# Patient Record
Sex: FEMALE | Race: WHITE | Employment: STUDENT | URBAN - METROPOLITAN AREA
[De-identification: names, ages, dates, MRNs, and addresses within clinical notes are randomized per-mention and may not be internally consistent; named-entity substitution may affect disease eponyms.]

---

## 2017-04-05 ENCOUNTER — OFFICE VISIT (OUTPATIENT)
Dept: URGENT CARE | Facility: CLINIC | Age: 22
End: 2017-04-05
Payer: COMMERCIAL

## 2017-04-05 VITALS
BODY MASS INDEX: 27.35 KG/M2 | SYSTOLIC BLOOD PRESSURE: 120 MMHG | TEMPERATURE: 99.2 F | WEIGHT: 164.13 LBS | HEART RATE: 85 BPM | DIASTOLIC BLOOD PRESSURE: 62 MMHG | HEIGHT: 65 IN | OXYGEN SATURATION: 96 %

## 2017-04-05 DIAGNOSIS — J01.40 ACUTE PANSINUSITIS, RECURRENCE NOT SPECIFIED: ICD-10-CM

## 2017-04-05 DIAGNOSIS — H65.01 RIGHT ACUTE SEROUS OTITIS MEDIA, RECURRENCE NOT SPECIFIED: ICD-10-CM

## 2017-04-05 PROCEDURE — 99214 OFFICE O/P EST MOD 30 MIN: CPT | Performed by: PHYSICIAN ASSISTANT

## 2017-04-05 RX ORDER — AMOXICILLIN AND CLAVULANATE POTASSIUM 875; 125 MG/1; MG/1
1 TABLET, FILM COATED ORAL 2 TIMES DAILY
Qty: 20 TAB | Refills: 0 | Status: SHIPPED | OUTPATIENT
Start: 2017-04-05 | End: 2017-04-15

## 2017-04-05 ASSESSMENT — ENCOUNTER SYMPTOMS
FOCAL WEAKNESS: 0
SHORTNESS OF BREATH: 0
NECK PAIN: 0
SENSORY CHANGE: 0
DIARRHEA: 0
SWOLLEN GLANDS: 0
SPUTUM PRODUCTION: 0
WHEEZING: 0
SORE THROAT: 0
COUGH: 1
MYALGIAS: 0
SINUS PRESSURE: 1
DIZZINESS: 0
VOMITING: 0
HOARSE VOICE: 0
FEVER: 0
NAUSEA: 0
HEADACHES: 1
TINGLING: 0
ABDOMINAL PAIN: 0
PALPITATIONS: 0
CHILLS: 1

## 2017-04-05 ASSESSMENT — PAIN SCALES - GENERAL: PAINLEVEL: 5=MODERATE PAIN

## 2017-04-05 NOTE — MR AVS SNAPSHOT
"        Conchita Car   2017 3:45 PM   Office Visit   MRN: [id number]    Department:  [facility]   Dept Phone:  [phone]    Description:  Female : 1995   Provider:  Rosa Maria Anderson PA-C           Reason for Visit     Sinusitis Bilateral ear pain, pressure, runny nose, congestion, eye pressure, fatigue.       Allergies as of 2017     No Known Allergies      You were diagnosed with     Acute pansinusitis, recurrence not specified   [[id number]]       Right acute serous otitis media, recurrence not specified   [[id number]]         Vital Signs     Blood Pressure Pulse Temperature Height Weight Body Mass Index    120/62 mmHg 85 37.3 °C (99.2 °F) 1.64 m (5' 4.57\") 74.447 kg (164 lb 2 oz) 27.68 kg/m2    Oxygen Saturation                   96%           Basic Information     Date Of Birth Sex Race Ethnicity Preferred Language    1995 Female White Unknown English      Problem List              ICD-10-CM Priority Class Noted - Resolved    Neck strain S16.1XXA   2012 - Present      Health Maintenance        Date Due Completion Dates    IMM HEP B VACCINE (1 of 3 - Primary Series) 1994 ---    IMM HEP A VACCINE (1 of 2 - Standard Series) 1995 ---    IMM HPV VACCINE (1 of 3 - Female 3 Dose Series) 2005 ---    IMM VARICELLA (CHICKENPOX) VACCINE (1 of 2 - 2 Dose Adolescent Series) 2007 ---    IMM DTaP/Tdap/Td Vaccine (1 - Tdap) 2013 ---    PAP SMEAR 2015 ---            Current Immunizations     No immunizations on file.      Below and/or attached are the medications your provider expects you to take. Review all of your home medications and newly ordered medications with your provider and/or pharmacist. Follow medication instructions as directed by your provider and/or pharmacist. Please keep your medication list with you and share with your provider. Update the information when medications are discontinued, doses are changed, or new medications (including " over-the-counter products) are added; and carry medication information at all times in the event of emergency situations     Allergies:  No Known Allergies          Medications  Valid as of: April 05, 2017 -  4:11 PM    Generic Name Brand Name Tablet Size Instructions for use    Amoxicillin-Pot Clavulanate (Tab) AUGMENTIN 875-125 MG Take 1 Tab by mouth 2 times a day for 10 days.        DM-Phenylephrine-Acetaminophen   Take  by mouth.        Naproxen (Tab) NAPROSYN 500 MG Take 1 Tab by mouth 2 times a day, with meals. Prn pain        .                 Medicines prescribed today were sent to:     [facility] [address] [place] [address], [place] - [address] [facility] AT [facility]    [address] [facility] [place] [address]    Phone: [phone] Fax: [phone]    Open 24 Hours?: No      Medication refill instructions:       If your prescription bottle indicates you have medication refills left, it is not necessary to call your provider’s office. Please contact your pharmacy and they will refill your medication.    If your prescription bottle indicates you do not have any refills left, you may request refills at any time through one of the following ways: The online Smartaxi system (except Urgent Care), by calling your provider’s office, or by asking your pharmacy to contact your provider’s office with a refill request. Medication refills are processed only during regular business hours and may not be available until the next business day. Your provider may request additional information or to have a follow-up visit with you prior to refilling your medication.   *Please Note: Medication refills are assigned a new Rx number when refilled electronically. Your pharmacy may indicate that no refills were authorized even though a new prescription for the same medication is available at the pharmacy. Please request the medicine by name with the pharmacy before contacting your provider for a refill.            TutorGroup Access Code: [email or web address]  Expires: 5/5/2017  4:11 PM    Your email address is not on file at [facility].  Email Addresses are required for you to sign up for [employer]utor[employer], please contact [phone] to verify your personal information and to provide your email address prior to attempting to register for TutorGroup.    Conchita Car  [facility] [place] [address]    TutorGroup  A secure, online tool to manage your health information     [facility]’s TutorGroup® is a secure, online tool that connects you to your personalized health information from the privacy of your home -- day or night - making it very easy for you to manage your healthcare. Once the activation process is completed, you can even access your medical information using the TutorGroup candice, which is available for free in the Apple Candice store or Google Play store.     To learn more about TutorGroup, visit [email or web address]    There are two levels of access available (as shown below):   My Chart Features  [facility] Primary Care Doctor [facility]  Specialists [facility]  Urgent  Care Non-[facility] Primary Care Doctor   Email your healthcare team securely and privately 24/7 X X X    Manage appointments: schedule your next appointment; view details of past/upcoming appointments X      Request prescription refills. X      View recent personal medical records, including lab and immunizations X X X X   View health record, including health history, allergies, medications X X X X   Read reports about your outpatient visits, procedures, consult and ER notes X X X X   See your discharge summary, which is a recap of your hospital and/or ER visit that includes your diagnosis, lab results, and care plan X X  X     How to register for TutorGroup:  Once your e-mail address has been verified, follow the following steps to sign up for [employer].     1. Go to  [email or web address]  2. Click on the Sign Up Now box, which takes you to the New Member Sign Up page.  You will need to provide the following information:  a. Enter your farmaciamarket Access Code exactly as it appears at the top of this page. (You will not need to use this code after you’ve completed the sign-up process. If you do not sign up before the expiration date, you must request a new code.)   b. Enter your date of birth.   c. Enter your home email address.   d. Click Submit, and follow the next screen’s instructions.  3. Create a StellaServicet ID. This will be your farmaciamarket login ID and cannot be changed, so think of one that is secure and easy to remember.  4. Create a farmaciamarket password. You can change your password at any time.  5. Enter your Password Reset Question and Answer. This can be used at a later time if you forget your password.   6. Enter your e-mail address. This allows you to receive e-mail notifications when new information is available in farmaciamarket.  7. Click Sign Up. You can now view your health information.    For assistance activating your farmaciamarket account, call [phone]

## 2018-12-21 ENCOUNTER — OFFICE VISIT (OUTPATIENT)
Dept: URGENT CARE | Facility: CLINIC | Age: 23
End: 2018-12-21
Payer: COMMERCIAL

## 2018-12-21 VITALS
HEIGHT: 65 IN | TEMPERATURE: 97.4 F | DIASTOLIC BLOOD PRESSURE: 72 MMHG | OXYGEN SATURATION: 97 % | SYSTOLIC BLOOD PRESSURE: 120 MMHG | HEART RATE: 66 BPM | BODY MASS INDEX: 27.32 KG/M2 | WEIGHT: 164 LBS

## 2018-12-21 DIAGNOSIS — J35.8 TONSILLAR EXUDATE: ICD-10-CM

## 2018-12-21 DIAGNOSIS — Z20.818 EXPOSURE TO STREP THROAT: ICD-10-CM

## 2018-12-21 LAB
INT CON NEG: NORMAL
INT CON POS: NORMAL
S PYO AG THROAT QL: NORMAL

## 2018-12-21 PROCEDURE — 87880 STREP A ASSAY W/OPTIC: CPT | Performed by: NURSE PRACTITIONER

## 2018-12-21 PROCEDURE — 99213 OFFICE O/P EST LOW 20 MIN: CPT | Mod: 25 | Performed by: NURSE PRACTITIONER

## 2018-12-21 ASSESSMENT — ENCOUNTER SYMPTOMS
MYALGIAS: 0
VOMITING: 0
SWOLLEN GLANDS: 0
NAUSEA: 0
DIZZINESS: 0
COUGH: 0
EYE PAIN: 0
CHILLS: 0
SORE THROAT: 1
TROUBLE SWALLOWING: 0
FEVER: 0
SHORTNESS OF BREATH: 0
HOARSE VOICE: 0

## 2018-12-21 NOTE — PROGRESS NOTES
Subjective:   Conchita Car is a 23 y.o. female who presents for Pharyngitis (exposure to strep, white spot on throat x1 day )        Pharyngitis    This is a new problem. The current episode started yesterday. The problem has been unchanged. The pain is worse on the left side. There has been no fever. The pain is at a severity of 5/10. The pain is moderate. Pertinent negatives include no congestion, coughing, ear discharge, ear pain, hoarse voice, plugged ear sensation, shortness of breath, swollen glands, trouble swallowing or vomiting. Associated symptoms comments: White spot on left tonsil  . She has had no exposure to strep. She has tried acetaminophen for the symptoms. The treatment provided no relief.     Review of Systems   Constitutional: Negative for chills and fever.   HENT: Positive for sore throat. Negative for congestion, ear discharge, ear pain, hoarse voice and trouble swallowing.    Eyes: Negative for pain.   Respiratory: Negative for cough and shortness of breath.    Cardiovascular: Negative for chest pain.   Gastrointestinal: Negative for nausea and vomiting.   Genitourinary: Negative for hematuria.   Musculoskeletal: Negative for myalgias.   Skin: Negative for rash.   Neurological: Negative for dizziness.     No Known Allergies   Objective:   There were no vitals taken for this visit.  Physical Exam   Constitutional: She is oriented to person, place, and time. She appears well-developed and well-nourished. No distress.   HENT:   Head: Normocephalic and atraumatic.   Right Ear: Tympanic membrane normal.   Left Ear: Tympanic membrane normal.   Nose: Nose normal. Right sinus exhibits no maxillary sinus tenderness and no frontal sinus tenderness. Left sinus exhibits no maxillary sinus tenderness and no frontal sinus tenderness.   Mouth/Throat: Uvula is midline, oropharynx is clear and moist and mucous membranes are normal. No trismus in the jaw. No uvula swelling. No posterior  oropharyngeal edema, posterior oropharyngeal erythema or tonsillar abscesses. Tonsillar exudate (left tonsil ).   Eyes: Pupils are equal, round, and reactive to light. Conjunctivae and EOM are normal. Right eye exhibits no discharge. Left eye exhibits no discharge.   Cardiovascular: Normal rate and regular rhythm.    No murmur heard.  Pulmonary/Chest: Effort normal and breath sounds normal. No respiratory distress.   Abdominal: Soft. She exhibits no distension. There is no tenderness.   Lymphadenopathy:     She has no cervical adenopathy.   Neurological: She is alert and oriented to person, place, and time. She has normal reflexes. No sensory deficit.   Skin: Skin is warm, dry and intact.   Psychiatric: She has a normal mood and affect.         Assessment/Plan:     1. Exposure to strep throat  POCT Rapid Strep A   2. Tonsillar exudate  POCT Rapid Strep A     Strep negative    Uvula midline, no trismus, no cellulitis, or peritonsillar abscess. Diagnosis consisted with viral. Encouraged warm gargles, viral etiology.Advised to continue supportive care with Tylenol and/or ibuprofen for fevers and discomfort. Increased fluids and electrolytes.  Patient given precautionary s/sx that mandate immediate follow up and evaluation in the ED. Advised of risks of not doing so.    DDX, Supportive care, and indications for immediate follow-up discussed with patient.    Instructed to return to clinic or nearest emergency department if we are not available for any change in condition, further concerns, or worsening of symptoms.    The patient demonstrated a good understanding and agreed with the treatment plan.

## 2021-04-20 ENCOUNTER — IMMUNIZATION (OUTPATIENT)
Dept: FAMILY PLANNING/WOMEN'S HEALTH CLINIC | Facility: IMMUNIZATION CENTER | Age: 26
End: 2021-04-20
Payer: COMMERCIAL

## 2021-04-20 DIAGNOSIS — Z23 ENCOUNTER FOR VACCINATION: Primary | ICD-10-CM

## 2021-04-20 PROCEDURE — 0001A PFIZER SARS-COV-2 VACCINE: CPT

## 2021-04-20 PROCEDURE — 91300 PFIZER SARS-COV-2 VACCINE: CPT

## 2021-05-14 ENCOUNTER — IMMUNIZATION (OUTPATIENT)
Dept: FAMILY PLANNING/WOMEN'S HEALTH CLINIC | Facility: IMMUNIZATION CENTER | Age: 26
End: 2021-05-14
Payer: COMMERCIAL

## 2021-05-14 DIAGNOSIS — Z23 ENCOUNTER FOR VACCINATION: Primary | ICD-10-CM

## 2021-05-14 PROCEDURE — 0002A PFIZER SARS-COV-2 VACCINE: CPT

## 2021-05-14 PROCEDURE — 91300 PFIZER SARS-COV-2 VACCINE: CPT

## 2024-09-17 ENCOUNTER — APPOINTMENT (OUTPATIENT)
Dept: RADIOLOGY | Facility: IMAGING CENTER | Age: 29
End: 2024-09-17
Payer: COMMERCIAL

## 2024-09-17 ENCOUNTER — OFFICE VISIT (OUTPATIENT)
Dept: URGENT CARE | Facility: CLINIC | Age: 29
End: 2024-09-17
Payer: COMMERCIAL

## 2024-09-17 VITALS
HEIGHT: 65 IN | DIASTOLIC BLOOD PRESSURE: 88 MMHG | OXYGEN SATURATION: 96 % | BODY MASS INDEX: 29.32 KG/M2 | HEART RATE: 75 BPM | WEIGHT: 176 LBS | SYSTOLIC BLOOD PRESSURE: 128 MMHG | RESPIRATION RATE: 20 BRPM | TEMPERATURE: 98.5 F

## 2024-09-17 DIAGNOSIS — M54.42 ACUTE BILATERAL LOW BACK PAIN WITH LEFT-SIDED SCIATICA: ICD-10-CM

## 2024-09-17 LAB
APPEARANCE UR: CLEAR
BILIRUB UR STRIP-MCNC: NEGATIVE MG/DL
COLOR UR AUTO: YELLOW
GLUCOSE UR STRIP.AUTO-MCNC: NEGATIVE MG/DL
KETONES UR STRIP.AUTO-MCNC: NEGATIVE MG/DL
LEUKOCYTE ESTERASE UR QL STRIP.AUTO: NORMAL
NITRITE UR QL STRIP.AUTO: NEGATIVE
PH UR STRIP.AUTO: 5.5 [PH] (ref 5–8)
POCT INT CON NEG: NEGATIVE
POCT INT CON POS: POSITIVE
POCT URINE PREGNANCY TEST: NEGATIVE
PROT UR QL STRIP: NEGATIVE MG/DL
RBC UR QL AUTO: NEGATIVE
SP GR UR STRIP.AUTO: 1.03
UROBILINOGEN UR STRIP-MCNC: NORMAL MG/DL

## 2024-09-17 PROCEDURE — 99203 OFFICE O/P NEW LOW 30 MIN: CPT

## 2024-09-17 PROCEDURE — 3079F DIAST BP 80-89 MM HG: CPT

## 2024-09-17 PROCEDURE — 81025 URINE PREGNANCY TEST: CPT

## 2024-09-17 PROCEDURE — 72100 X-RAY EXAM L-S SPINE 2/3 VWS: CPT | Mod: TC,FY | Performed by: RADIOLOGY

## 2024-09-17 PROCEDURE — 3074F SYST BP LT 130 MM HG: CPT

## 2024-09-17 PROCEDURE — 81002 URINALYSIS NONAUTO W/O SCOPE: CPT

## 2024-09-17 RX ORDER — KETOROLAC TROMETHAMINE 15 MG/ML
15 INJECTION, SOLUTION INTRAMUSCULAR; INTRAVENOUS ONCE
Status: COMPLETED | OUTPATIENT
Start: 2024-09-17 | End: 2024-09-17

## 2024-09-17 RX ORDER — LIDOCAINE 4 G/G
PATCH TOPICAL
Qty: 15 PATCH | Refills: 0 | Status: SHIPPED | OUTPATIENT
Start: 2024-09-17

## 2024-09-17 RX ORDER — METHOCARBAMOL 500 MG/1
TABLET, FILM COATED ORAL
Qty: 30 TABLET | Refills: 0 | Status: SHIPPED | OUTPATIENT
Start: 2024-09-17

## 2024-09-17 RX ADMIN — KETOROLAC TROMETHAMINE 15 MG: 15 INJECTION, SOLUTION INTRAMUSCULAR; INTRAVENOUS at 11:25

## 2024-09-17 ASSESSMENT — ENCOUNTER SYMPTOMS
DIARRHEA: 0
TINGLING: 1
FEVER: 0
SORE THROAT: 0
FLANK PAIN: 0
MYALGIAS: 0
VOMITING: 0
ABDOMINAL PAIN: 0
PERIANAL NUMBNESS: 0
NAUSEA: 0
BOWEL INCONTINENCE: 0
NUMBNESS: 1
BACK PAIN: 1
HEADACHES: 0
SHORTNESS OF BREATH: 0
PARESIS: 0
CHILLS: 0
COUGH: 0
PARESTHESIAS: 0

## 2024-09-17 NOTE — PROGRESS NOTES
"Subjective:   Conchita Car is a 29 y.o. female who presents for Back Pain (1 day)      Back Pain  This is a new problem. The current episode started in the past 7 days. The problem has been rapidly worsening (Rapidly worsened after bending over to  item yesterday) since onset. The pain is present in the lumbar spine. The pain radiates to the left thigh. The pain is moderate. The symptoms are aggravated by bending. Associated symptoms include numbness (Radiating down left lower extremity) and tingling (Left lower extremity). Pertinent negatives include no abdominal pain, bladder incontinence, bowel incontinence, chest pain, dysuria, fever, headaches, paresis, paresthesias, pelvic pain or perianal numbness. She has tried NSAIDs for the symptoms. The treatment provided mild relief.       Review of Systems   Constitutional:  Negative for chills, fever and malaise/fatigue.   HENT:  Negative for congestion, ear pain, hearing loss and sore throat.    Respiratory:  Negative for cough and shortness of breath.    Cardiovascular:  Negative for chest pain.   Gastrointestinal:  Negative for abdominal pain, bowel incontinence, diarrhea, nausea and vomiting.   Genitourinary:  Negative for bladder incontinence, dysuria, flank pain, frequency, hematuria, pelvic pain and urgency.   Musculoskeletal:  Positive for back pain. Negative for myalgias.   Skin:  Negative for rash.   Neurological:  Positive for tingling (Left lower extremity) and numbness (Radiating down left lower extremity). Negative for headaches and paresthesias.       Medications, Allergies, and current problem list reviewed today in Epic.     Objective:     /88   Pulse 75   Temp 36.9 °C (98.5 °F) (Temporal)   Resp 20   Ht 1.651 m (5' 5\")   Wt 79.8 kg (176 lb)   SpO2 96%     Physical Exam  Vitals and nursing note reviewed.   Constitutional:       General: She is not in acute distress.     Appearance: Normal appearance. She is not " ill-appearing.   HENT:      Head: Normocephalic and atraumatic.      Right Ear: Tympanic membrane normal.      Left Ear: Tympanic membrane normal.      Nose: Nose normal.      Mouth/Throat:      Mouth: Mucous membranes are moist.   Eyes:      Conjunctiva/sclera: Conjunctivae normal.   Cardiovascular:      Rate and Rhythm: Normal rate.      Heart sounds: Normal heart sounds.   Pulmonary:      Effort: Pulmonary effort is normal.   Abdominal:      General: Abdomen is flat.      Palpations: Abdomen is soft.   Musculoskeletal:      Cervical back: Normal range of motion.      Lumbar back: Spasms and tenderness present. No swelling, edema, deformity, signs of trauma or bony tenderness. Decreased range of motion.   Skin:     General: Skin is warm and dry.      Capillary Refill: Capillary refill takes less than 2 seconds.   Neurological:      Mental Status: She is alert and oriented to person, place, and time.   Psychiatric:         Mood and Affect: Mood normal.         Behavior: Behavior normal.       Results for orders placed or performed in visit on 09/17/24   POCT Urinalysis   Result Value Ref Range    POC Color YELLOW Negative    POC Appearance CLEAR Negative    POC Glucose NEGATIVE Negative mg/dL    POC Bilirubin NEGATIVE Negative mg/dL    POC Ketones NEGATIVE Negative mg/dL    POC Specific Gravity 1.030 <1.005 - >1.030    POC Blood NEGATIVE Negative    POC Urine PH 5.5 5.0 - 8.0    POC Protein NEGATIVE Negative mg/dL    POC Urobiligen 0.2 E. U. /DL Negative (0.2) mg/dL    POC Nitrites NEGATIVE Negative    POC Leukocyte Esterase SMALL Negative   POCT Pregnancy   Result Value Ref Range    POC Urine Pregnancy Test Negative     Internal Control Positive Positive     Internal Control Negative Negative        RADIOLOGY RESULTS   DX-LUMBAR SPINE-2 OR 3 VIEWS    Result Date: 9/17/2024 9/17/2024 11:23 AM HISTORY/REASON FOR EXAM:  Atraumatic Pain. Low back pain for 4 days TECHNIQUE/ EXAM DESCRIPTION AND NUMBER OF VIEWS:  3  views of the lumbar spine. COMPARISON: MRI lumbar spine 9/2/2005 FINDINGS: Alignment in the lumbar spine shows very slight curvature convex right with a Zaragoza angle 5 degrees. There are no vertebral anomalies. The bony trabecular pattern is normal.  The lumbar vertebral bodies have a normal height. The disc spaces are well maintained and symmetrical.  There is no evidence of spondylolisthesis or osseous lesion.  The posterior elements appear grossly normal on the limited series.     1.  Slight lumbar scoliosis convex right. 2.  Otherwise, unremarkable lumbar spine plain films.           Assessment/Plan:       1. Acute bilateral low back pain with left-sided sciatica  ketorolac (Toradol) 15 MG/ML injection 15 mg    DX-LUMBAR SPINE-2 OR 3 VIEWS    POCT Urinalysis    POCT Pregnancy    methocarbamol (ROBAXIN) 500 MG Tab    lidocaine (ASPERFLEX) 4 % Patch        After assessment it does appear that patient has acute low back pain with left-sided sciatica.  UA in office did show small amount of leukocytes but patient was having no urinary symptoms at this time.  Patient reports this pain was sudden after bending over to  item.  X-ray of lumbar spine showed no significant abnormalities.  It did state that she had slight lumbar scoliosis convex to the right.  Patient was not aware of any history of scoliosis in her past.  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  At this time patient was provided 15 mg of IM Toradol in office.  Patient was encouraged to continue use of over-the-counter anti-inflammatories do not take anything for the next 6 hours due to IM injection.  Patient was also prescribed Robaxin and lidocaine patches as needed for symptom management.  Patient instructed to take these as prescribed.  Patient also instructed to do gentle range of motion exercises and use heat/ice therapy as needed for symptoms.  Patient to monitor for any worsening signs and symptoms and if no  improvement on current treatment or any other concerns to return to urgent care or emergency department for further management.    Differential diagnosis, natural history, and supportive care discussed. We also reviewed side effects of medication including allergic response, GI upset, tendon injury, rash, sedation etc. Patient and/or guardian voices understanding.      Advised the patient to follow-up with the primary care physician for recheck, reevaluation, and consideration of further management.    I personally reviewed prior external notes and test results pertinent to today's visit as well as additional imaging and testing completed in clinic today.     Please note that this dictation was created using voice recognition software. I have made every reasonable attempt to correct obvious errors, but I expect that there are errors of grammar and possibly content that I did not discover before finalizing the note.    This note was electronically signed by NBA Mojica

## 2024-10-21 ENCOUNTER — APPOINTMENT (OUTPATIENT)
Dept: URGENT CARE | Facility: CLINIC | Age: 29
End: 2024-10-21
Payer: COMMERCIAL